# Patient Record
Sex: FEMALE | Race: BLACK OR AFRICAN AMERICAN | NOT HISPANIC OR LATINO
[De-identification: names, ages, dates, MRNs, and addresses within clinical notes are randomized per-mention and may not be internally consistent; named-entity substitution may affect disease eponyms.]

---

## 2022-08-02 PROBLEM — Z00.00 ENCOUNTER FOR PREVENTIVE HEALTH EXAMINATION: Status: ACTIVE | Noted: 2022-08-02

## 2022-08-03 ENCOUNTER — APPOINTMENT (OUTPATIENT)
Dept: ORTHOPEDIC SURGERY | Facility: CLINIC | Age: 27
End: 2022-08-03

## 2022-08-19 ENCOUNTER — NON-APPOINTMENT (OUTPATIENT)
Age: 27
End: 2022-08-19

## 2023-03-21 ENCOUNTER — NON-APPOINTMENT (OUTPATIENT)
Age: 28
End: 2023-03-21

## 2023-03-23 ENCOUNTER — NON-APPOINTMENT (OUTPATIENT)
Age: 28
End: 2023-03-23

## 2024-02-07 ENCOUNTER — APPOINTMENT (OUTPATIENT)
Dept: ORTHOPEDIC SURGERY | Facility: CLINIC | Age: 29
End: 2024-02-07
Payer: COMMERCIAL

## 2024-02-07 VITALS — HEIGHT: 72 IN | WEIGHT: 257 LBS | BODY MASS INDEX: 34.81 KG/M2

## 2024-02-07 DIAGNOSIS — Z78.9 OTHER SPECIFIED HEALTH STATUS: ICD-10-CM

## 2024-02-07 PROCEDURE — ZZZZZ: CPT

## 2024-02-07 NOTE — PROCEDURE
[Large Joint Injection] : Large joint injection [Right] : of the right [Knee] : knee [Pain] : pain [Inflammation] : inflammation [Alcohol] : alcohol [Betadine] : betadine [Ethyl Chloride sprayed topically] : ethyl chloride sprayed topically [Sterile technique used] : sterile technique used [___ cc    3mg] :  Betamethasone (Celestone) ~Vcc of 3mg [___ cc    0.25%] : Bupivacaine (Marcaine) ~Vcc of 0.25%  [Effusion] : effusion [] : Patient tolerated procedure well [Call if redness, pain or fever occur] : call if redness, pain or fever occur [Apply ice for 15min out of every hour for the next 12-24 hours as tolerated] : apply ice for 15 minutes out of every hour for the next 12-24 hours as tolerated [Patient was advised to rest the joint(s) for ____ days] : patient was advised to rest the joint(s) for [unfilled] days [Previous OTC use and PT nontherapeutic] : patient has tried OTC's including aspirin, Ibuprofen, Aleve, etc or prescription NSAIDS, and/or exercises at home and/or physical therapy without satisfactory response [Risks, benefits, alternatives discussed / Verbal consent obtained] : the risks benefits, and alternatives have been discussed, and verbal consent was obtained [Prior failure or difficult injection] : prior failure or difficult injection [All ultrasound images have been permanently captured and stored accordingly in our picture archiving and communication system] : All ultrasound images have been permanently captured and stored accordingly in our picture archiving and communication system [Visualization of the needle and placement of injection was performed without complication] : visualization of the needle and placement of injection was performed without complication

## 2024-02-11 NOTE — PHYSICAL EXAM
[NL (140)] : flexion 140 degrees [NL (0)] : extension 0 degrees [Right] : right knee [] : non-antalgic [FreeTextEntry9] : X-Ray  right  knee reveals evidence of previous ACl reconstruction with all tunnels in proper anatomical positioning with no evidence of  arthritis

## 2024-02-11 NOTE — DISCUSSION/SUMMARY
[Medication Risks Reviewed] : Medication risks reviewed [de-identified] :  Pt had ACL reconstruction with hamstring autograph 6/2022  X-Ray  right  knee reveals evidence of previous ACl reconstruction with all tunnels in reasonable position  with no evidence of  arthritis   Pt has a solid lachman  and no signs of instability upon physical exam so not concerned  for retearing however due to noted effusion concerned she tore something, has some laxity of acl on draw  Discussed injecting and aspiration of the R knee  to help  discover source of swelling. Due to effusion of the knee. Plan for R knee aspiration-   Discussed treatment options in the form of injection therapy. Patient elected to receive R knee CSI under ultrasound guidance. Advised patient to rest and ice the area.   The risks, benefits and contents of the injection have been discussed. Risks include but are not limited to allergic reaction, flare reaction, permanent white skin discoloration at the injection site and infection.  The patient understands the risks and agrees to having the injection.  All questions have been answered.  meniscal tear vs ligament tear, discussed risks of potential meniscal surgery and risks of operative  and non operative treatment of cartilge injury, will obtain mri to see if surgery is necessary and guide future treatment, in interim discussed use of nsaids and side effects and recommended rehab and discussed risks and benefits of injection Discussed the timing of the injections and the follow up that is needed. Advised the patient to ice the area that was injected and that it may take a few days before experiencing relief.     Due to worsening pain and instability with mechanical symptoms, recommend the patient obtain MRI R knee  to rule out  meniscal tear . Follow up after MRI to possibly rule out surgical pathology and discuss future treatment options.   f/u after MRI    I, Sneha Hung, attest that this documentation has been prepared under the direction and in the presence of Provider Dr. Khalif Estrada    [Surgical risks reviewed] : Surgical risks reviewed

## 2024-02-11 NOTE — HISTORY OF PRESENT ILLNESS
[de-identified] : Patient is here for right knee injury occurred in 4/2022, and had ACL recon in 6/2022. Surgeon was Dr. Pantera Quiros in [place]. Moved to [place], unable to continue care with him. Attending PT at [facility]. Notes improvement with ROM. Noticed within the last year, difficulty with full WB/bending. Weakness in quad. Swelling. Pain is medial. Experiencing stiffness. No recent imaging. Tried Ibuprofen.

## 2024-02-15 ENCOUNTER — APPOINTMENT (OUTPATIENT)
Dept: MRI IMAGING | Facility: CLINIC | Age: 29
End: 2024-02-15
Payer: COMMERCIAL

## 2024-02-15 PROCEDURE — 73721 MRI JNT OF LWR EXTRE W/O DYE: CPT | Mod: RT

## 2024-02-21 ENCOUNTER — APPOINTMENT (OUTPATIENT)
Dept: ORTHOPEDIC SURGERY | Facility: CLINIC | Age: 29
End: 2024-02-21
Payer: COMMERCIAL

## 2024-02-21 DIAGNOSIS — S83.205A OTHER TEAR OF UNSPECIFIED MENISCUS, CURRENT INJURY, UNSPECIFIED KNEE, INITIAL ENCOUNTER: ICD-10-CM

## 2024-02-21 PROCEDURE — ZZZZZ: CPT

## 2024-02-21 RX ORDER — MELOXICAM 15 MG/1
15 TABLET ORAL
Qty: 30 | Refills: 0 | Status: ACTIVE | COMMUNITY
Start: 2024-02-21 | End: 1900-01-01

## 2024-02-26 PROBLEM — S83.205A COMPLEX TEAR OF MENISCUS OF KNEE: Status: ACTIVE | Noted: 2024-02-07

## 2024-02-26 NOTE — HISTORY OF PRESENT ILLNESS
[de-identified] : Patient is here to follow up on MRI for right knee. Celestone injection/aspiration from 2/7/24 gave relief for 1 week. Notes improvement.

## 2024-02-26 NOTE — PHYSICAL EXAM
[Right] : right knee [NL (0)] : extension 0 degrees [] : non-antalgic [TWNoteComboBox7] : flexion 130 degrees

## 2024-02-26 NOTE — DISCUSSION/SUMMARY
[Medication Risks Reviewed] : Medication risks reviewed [Surgical risks reviewed] : Surgical risks reviewed [de-identified] : MRI of the R knee revealed s/p ACL reconstruction without recurrent tear  and tearing of the posterior horn and body of the lateral meniscus and tricompartmental arthrosis    We reviewed the MRI findings and discussed treatment options, both operative and non-operative for the patients meniscal tearing considering OA. The patient is aware and understands that there is a much higher failure rate of arthroscopic surgery considering OA. Discussed managing arthritic related pain first before discussing possible arthroscopic surgery. Not ruling out at this time, however would hold as last resort.    Discussed the course of arthritic change of her R knee s/p her previous ACL surgery discussed morbidity an diminishing returns of additonal surgery  Prescribed patient Meloxicam 15mg daily and discussed risks of side effects, as well as timing/management of medication.  Side effects can include but are not limited to gastrointestinal ulcers and irritation, kidney failure, and bleeding issues. Use as directed and take with food to manage pain, inflammation, and discomfort.   Plan for PT Will submit for Synvisc injections of the R knee f/u  for  HESTER injections    I, Sneha Hung, attest that this documentation has been prepared under the direction and in the presence of Provider Dr. Khalif Estrada

## 2024-02-26 NOTE — DATA REVIEWED
[MRI] : MRI [Right] : of the right [Knee] : knee [Report was reviewed and noted in the chart] : The report was reviewed and noted in the chart [I independently reviewed and interpreted images and report] : I independently reviewed and interpreted images and report [I reviewed the films/CD] : I reviewed the films/CD [FreeTextEntry1] : MRI of the R knee revealed s/p ACL reconstruction without recurrent tear  and tearing of the posterior horn and body of the lateral meniscus and tricompartmental arthrosis

## 2024-03-20 ENCOUNTER — APPOINTMENT (OUTPATIENT)
Dept: ORTHOPEDIC SURGERY | Facility: CLINIC | Age: 29
End: 2024-03-20
Payer: COMMERCIAL

## 2024-03-20 VITALS — WEIGHT: 257 LBS | BODY MASS INDEX: 34.81 KG/M2 | HEIGHT: 72 IN

## 2024-03-20 PROCEDURE — ZZZZZ: CPT

## 2024-03-20 NOTE — PROCEDURE
[Large Joint Injection] : Large joint injection [Right] : of the right [Knee] : knee [X-ray evidence of Osteoarthritis on this or prior visit] : x-ray evidence of Osteoarthritis on this or prior visit [Alcohol] : alcohol [Betadine] : betadine [Ethyl Chloride sprayed topically] : ethyl chloride sprayed topically [Sterile technique used] : sterile technique used [Euflexxa(20mg)] : 20mg of Euflexxa [#1] : series #1 [] : Patient tolerated procedure well [Call if redness, pain or fever occur] : call if redness, pain or fever occur [Apply ice for 15min out of every hour for the next 12-24 hours as tolerated] : apply ice for 15 minutes out of every hour for the next 12-24 hours as tolerated [Patient was advised to rest the joint(s) for ____ days] : patient was advised to rest the joint(s) for [unfilled] days [Previous OTC use and PT nontherapeutic] : patient has tried OTC's including aspirin, Ibuprofen, Aleve, etc or prescription NSAIDS, and/or exercises at home and/or physical therapy without satisfactory response [Risks, benefits, alternatives discussed / Verbal consent obtained] : the risks benefits, and alternatives have been discussed, and verbal consent was obtained [Prior failure or difficult injection] : prior failure or difficult injection [All ultrasound images have been permanently captured and stored accordingly in our picture archiving and communication system] : All ultrasound images have been permanently captured and stored accordingly in our picture archiving and communication system [Visualization of the needle and placement of injection was performed without complication] : visualization of the needle and placement of injection was performed without complication [Pain] : pain

## 2024-03-26 ENCOUNTER — APPOINTMENT (OUTPATIENT)
Dept: ORTHOPEDIC SURGERY | Facility: CLINIC | Age: 29
End: 2024-03-26
Payer: COMMERCIAL

## 2024-03-26 PROCEDURE — ZZZZZ: CPT

## 2024-03-26 NOTE — PROCEDURE
[Right] : of the right [Large Joint Injection] : Large joint injection [Knee] : knee [X-ray evidence of Osteoarthritis on this or prior visit] : x-ray evidence of Osteoarthritis on this or prior visit [Betadine] : betadine [Alcohol] : alcohol [Sterile technique used] : sterile technique used [Ethyl Chloride sprayed topically] : ethyl chloride sprayed topically [Euflexxa(20mg)] : 20mg of Euflexxa [#2] : series #2 [Call if redness, pain or fever occur] : call if redness, pain or fever occur [] : Patient tolerated procedure well [Patient was advised to rest the joint(s) for ____ days] : patient was advised to rest the joint(s) for [unfilled] days [Apply ice for 15min out of every hour for the next 12-24 hours as tolerated] : apply ice for 15 minutes out of every hour for the next 12-24 hours as tolerated [Previous OTC use and PT nontherapeutic] : patient has tried OTC's including aspirin, Ibuprofen, Aleve, etc or prescription NSAIDS, and/or exercises at home and/or physical therapy without satisfactory response [Risks, benefits, alternatives discussed / Verbal consent obtained] : the risks benefits, and alternatives have been discussed, and verbal consent was obtained [Prior failure or difficult injection] : prior failure or difficult injection [All ultrasound images have been permanently captured and stored accordingly in our picture archiving and communication system] : All ultrasound images have been permanently captured and stored accordingly in our picture archiving and communication system [Visualization of the needle and placement of injection was performed without complication] : visualization of the needle and placement of injection was performed without complication [Pain] : pain

## 2024-03-26 NOTE — HISTORY OF PRESENT ILLNESS
[de-identified] : Patient is here to follow up on right knee patella tendinitis/OA. Did not begin PT yet due to traveling to [facility] for 2 weeks. Will begin PT at [facility] in [place]. Notes improvement, no pain currently. Clicking.

## 2024-03-26 NOTE — DISCUSSION/SUMMARY
[Medication Risks Reviewed] : Medication risks reviewed [Surgical risks reviewed] : Surgical risks reviewed [de-identified] : We discussed treatment options, both operative and non-operative for the patients meniscal tearing considering OA. The patient is aware and understands that there is a much higher failure rate of arthroscopic surgery considering OA and since pt is doing well with management of arthritic related pain in the form of NSAIDs recommend she continue to proceed with conservative treatment at this time. Discussed further treatment option in the form of HA injections. Pt would like to proceed with R knee Euflexxa today.  Discussed the course of arthritic change of her R knee s/p her previous ACL surgery and discussed morbidity and diminishing returns of additional surgery   Discussed risks of surgical treatment and nonsurgical treatment of arthritis, discussed risks of steroid injection plus or minus visco supplementation, risks of zilretta and benefits, role of surgery and MRI, risks and role of NSAIDs and side effects, benefits of therapy.  Evaluated the patients _ KNEE and decided to proceed with Euflexxa injections #_, discussed future treatment options, will proceed, discussed possible surgery vs alternatives in future if symptoms worsen or persist despite injection series.    The risks, benefits and contents of the injection have been discussed. The risks include but are not limited to allergic reaction, flare reaction, permanent white skin discoloration at the injection site and infection. The patient understands the risks and agrees to having the injection. All questions have been answered.   Discussed the timing of the injections and the follow up that is needed. Advised the patient to ice the area that was injected and that it may take a few days before experiencing relief.   Prescribed patient Meloxicam 15mg daily and discussed risks of side effects, as well as timing/management of medication.  Side effects can include but are not limited to gastrointestinal ulcers and irritation, kidney failure, and bleeding issues. Use as directed and take with food to manage pain, inflammation, and discomfort. Plan for PT f/u in 1 week for continued Euflexxa inejctions   I, Sneha Hung, attest that this documentation has been prepared under the direction and in the presence of Provider Dr. Khalif Estrada

## 2024-04-01 NOTE — DISCUSSION/SUMMARY
[Medication Risks Reviewed] : Medication risks reviewed [Surgical risks reviewed] : Surgical risks reviewed [de-identified] : Discussed the course of arthritic change of her R knee s/p her previous ACL surgery and discussed morbidity and diminishing returns of additional surgery   Discussed risks of surgical treatment and nonsurgical treatment of arthritis, discussed risks of steroid injection plus or minus visco supplementation, risks of zilretta and benefits, role of surgery and MRI, risks and role of NSAIDs and side effects, benefits of therapy.  Evaluated the patients right KNEE and decided to proceed with Euflexxa injections #2 with U/s guidance, discussed future treatment options, will proceed, discussed possible surgery vs alternatives in future if symptoms worsen or persist despite injection series.    The risks, benefits and contents of the injection have been discussed. The risks include but are not limited to allergic reaction, flare reaction, permanent white skin discoloration at the injection site and infection. The patient understands the risks and agrees to having the injection. All questions have been answered.   Discussed the timing of the injections and the follow up that is needed. Advised the patient to ice the area that was injected and that it may take a few days before experiencing relief. Prescribed patient Meloxicam 15mg daily and discussed risks of side effects, as well as timing/management of medication.  Side effects can include but are not limited to gastrointestinal ulcers and irritation, kidney failure, and bleeding issues. Use as directed and take with food to manage pain, inflammation, and discomfort.  (an e/m was performed and billed with this injection because the patients treatment plan is still evolving and in a state of flux, unclear if injections are helping or worth continuing and at this and each of the visits we are re evaluating the patients exam , complaints , response to treatment and discussing risks of total knee replacement which may be inevitable. We have also once again reviewed alternative provided we dont want to proceed with the injection even though we ultimately decided to proceed)  f/u in 1 week for continued Euflexxa inejctions   I, Sneha uHng, attest that this documentation has been prepared under the direction and in the presence of Provider Dr. Khalif Estrada

## 2024-04-01 NOTE — HISTORY OF PRESENT ILLNESS
[de-identified] : Patient is here to follow up on right knee. Had Euflexxa Injection #1 last visit. still in pain

## 2024-04-03 ENCOUNTER — APPOINTMENT (OUTPATIENT)
Dept: ORTHOPEDIC SURGERY | Facility: CLINIC | Age: 29
End: 2024-04-03
Payer: COMMERCIAL

## 2024-04-03 VITALS — HEIGHT: 72 IN | BODY MASS INDEX: 34.81 KG/M2 | WEIGHT: 257 LBS

## 2024-04-03 DIAGNOSIS — M23.91 UNSPECIFIED INTERNAL DERANGEMENT OF RIGHT KNEE: ICD-10-CM

## 2024-04-03 DIAGNOSIS — M17.11 UNILATERAL PRIMARY OSTEOARTHRITIS, RIGHT KNEE: ICD-10-CM

## 2024-04-03 PROCEDURE — ZZZZZ: CPT

## 2024-04-03 NOTE — PROCEDURE
[Large Joint Injection] : Large joint injection [Knee] : knee [Right] : of the right [X-ray evidence of Osteoarthritis on this or prior visit] : x-ray evidence of Osteoarthritis on this or prior visit [Alcohol] : alcohol [Betadine] : betadine [Ethyl Chloride sprayed topically] : ethyl chloride sprayed topically [Sterile technique used] : sterile technique used [Euflexxa(20mg)] : 20mg of Euflexxa [#3] : series #3 [] : Patient tolerated procedure well [Call if redness, pain or fever occur] : call if redness, pain or fever occur [Apply ice for 15min out of every hour for the next 12-24 hours as tolerated] : apply ice for 15 minutes out of every hour for the next 12-24 hours as tolerated [Patient was advised to rest the joint(s) for ____ days] : patient was advised to rest the joint(s) for [unfilled] days [Previous OTC use and PT nontherapeutic] : patient has tried OTC's including aspirin, Ibuprofen, Aleve, etc or prescription NSAIDS, and/or exercises at home and/or physical therapy without satisfactory response [Risks, benefits, alternatives discussed / Verbal consent obtained] : the risks benefits, and alternatives have been discussed, and verbal consent was obtained [Prior failure or difficult injection] : prior failure or difficult injection [All ultrasound images have been permanently captured and stored accordingly in our picture archiving and communication system] : All ultrasound images have been permanently captured and stored accordingly in our picture archiving and communication system [Visualization of the needle and placement of injection was performed without complication] : visualization of the needle and placement of injection was performed without complication [Pain] : pain

## 2024-04-09 PROBLEM — M23.91 INTERNAL DERANGEMENT OF RIGHT KNEE: Status: ACTIVE | Noted: 2024-02-11

## 2024-04-09 NOTE — HISTORY OF PRESENT ILLNESS
[de-identified] : Patient is here to follow up on right knee. Had Euflexxa Injection #2 last visit. still in pain

## 2024-04-09 NOTE — DISCUSSION/SUMMARY
[Medication Risks Reviewed] : Medication risks reviewed [Surgical risks reviewed] : Surgical risks reviewed [de-identified] : Discussed the course of arthritic change of her R knee s/p her previous ACL surgery and discussed morbidity and diminishing returns of additional surgery   Discussed risks of surgical treatment and nonsurgical treatment of arthritis, discussed risks of steroid injection plus or minus visco supplementation, risks of zilretta and benefits, role of surgery and MRI, risks and role of NSAIDs and side effects, benefits of therapy.  Evaluated the patients right KNEE and decided to proceed with Euflexxa injections #3 with U/s guidance, discussed future treatment options, will proceed, discussed possible surgery vs alternatives in future if symptoms worsen or persist despite injection series.    The risks, benefits and contents of the injection have been discussed. The risks include but are not limited to allergic reaction, flare reaction, permanent white skin discoloration at the injection site and infection. The patient understands the risks and agrees to having the injection. All questions have been answered.   Discussed the timing of the injections and the follow up that is needed. Advised the patient to ice the area that was injected and that it may take a few days before experiencing relief.   (an e/m was performed and billed with this injection because the patients treatment plan is still evolving and in a state of flux, unclear if injections are helping or worth continuing and at this and each of the visits we are re evaluating the patients exam , complaints , response to treatment and discussing risks of total knee replacement which may be inevitable. We have also once again reviewed alternative provided we dont want to proceed with the injection even though we ultimately decided to proceed)   Informed the patient that this is the final injection of the series, and may take a few days before experiencing relief. The patient can repeat the series in 6 months if needed. Follow up in 6 weeks if any issues arise, or pain persists - otherwise on a PRN basis. discussed possible need for total knee replacement and risks of such as well as morbidity, will see how pain evolves   I, Sneha Hung, attest that this documentation has been prepared under the direction and in the presence of Provider Dr. Khalif Estrada

## 2024-12-12 ENCOUNTER — NON-APPOINTMENT (OUTPATIENT)
Age: 29
End: 2024-12-12

## 2024-12-18 ENCOUNTER — NON-APPOINTMENT (OUTPATIENT)
Age: 29
End: 2024-12-18

## 2025-02-12 ENCOUNTER — NON-APPOINTMENT (OUTPATIENT)
Age: 30
End: 2025-02-12